# Patient Record
Sex: MALE | Race: AMERICAN INDIAN OR ALASKA NATIVE
[De-identification: names, ages, dates, MRNs, and addresses within clinical notes are randomized per-mention and may not be internally consistent; named-entity substitution may affect disease eponyms.]

---

## 2021-01-04 ENCOUNTER — HOSPITAL ENCOUNTER (EMERGENCY)
Dept: HOSPITAL 5 - ED | Age: 61
LOS: 1 days | Discharge: HOME | End: 2021-01-05
Payer: COMMERCIAL

## 2021-01-04 DIAGNOSIS — Z98.890: ICD-10-CM

## 2021-01-04 DIAGNOSIS — U07.1: Primary | ICD-10-CM

## 2021-01-04 PROCEDURE — 80053 COMPREHEN METABOLIC PANEL: CPT

## 2021-01-04 PROCEDURE — 85025 COMPLETE CBC W/AUTO DIFF WBC: CPT

## 2021-01-04 PROCEDURE — 36415 COLL VENOUS BLD VENIPUNCTURE: CPT

## 2021-01-04 PROCEDURE — 71046 X-RAY EXAM CHEST 2 VIEWS: CPT

## 2021-01-05 VITALS — DIASTOLIC BLOOD PRESSURE: 86 MMHG | SYSTOLIC BLOOD PRESSURE: 128 MMHG

## 2021-01-05 LAB
ALBUMIN SERPL-MCNC: 3.8 G/DL (ref 3.9–5)
ALT SERPL-CCNC: 83 UNITS/L (ref 7–56)
BASOPHILS # (AUTO): 0 K/MM3 (ref 0–0.1)
BASOPHILS NFR BLD AUTO: 0.5 % (ref 0–1.8)
BUN SERPL-MCNC: 10 MG/DL (ref 9–20)
BUN/CREAT SERPL: 13 %
CALCIUM SERPL-MCNC: 9.3 MG/DL (ref 8.4–10.2)
EOSINOPHIL # BLD AUTO: 0 K/MM3 (ref 0–0.4)
EOSINOPHIL NFR BLD AUTO: 0.2 % (ref 0–4.3)
HCT VFR BLD CALC: 36 % (ref 35.5–45.6)
HEMOLYSIS INDEX: 5
HGB BLD-MCNC: 13.2 GM/DL (ref 11.8–15.2)
LYMPHOCYTES # BLD AUTO: 0.6 K/MM3 (ref 1.2–5.4)
LYMPHOCYTES NFR BLD AUTO: 11.9 % (ref 13.4–35)
MCHC RBC AUTO-ENTMCNC: 37 % (ref 32–34)
MCV RBC AUTO: 93 FL (ref 84–94)
MONOCYTES # (AUTO): 0.3 K/MM3 (ref 0–0.8)
MONOCYTES % (AUTO): 6.4 % (ref 0–7.3)
PLATELET # BLD: 290 K/MM3 (ref 140–440)
RBC # BLD AUTO: 3.86 M/MM3 (ref 3.65–5.03)

## 2021-01-05 NOTE — EVENT NOTE
ED Screening Note


ED Screening Note: 





Patient states that he began having symptoms of COVID-19 on 12/26/2020


He states he then tested positive for COVID-19 on 12/29/2020


He states his symptoms have been fever, mild dry cough, nausea


He states occasionally he gets winded with talking but otherwise feels no 

shortness of breath


He denies any chest pain


He states his wife has been monitoring his pulse ox at home and he states that 2

hours ago it had dropped to 86% on room air


He denies any past medical history, no allergies to medications, non-smoker





This initial assessment/diagnostic orders/clinical plan/treatment(s) is/are 

subject to change based on patients health status, clinical progression and re-

assessment by fellow clinical providers in the ED. Further treatment and workup 

at subsequent clinical providers discretion. Patient/guardian urged not to elope

from the ED as their condition may be serious if not clinically assessed and 

managed. 





Initial orders include: 


labs, CXR

## 2021-01-05 NOTE — EMERGENCY DEPARTMENT REPORT
HPI





- General


Chief Complaint: Dyspnea/Respdistress


PUI?: Yes


Time Seen by Provider: 21 00:54





- HPI


HPI: 





Room 39








The patient is a 60-year-old male present with a chief complaint of low pulse 

ox.  The patient was diagnosed Covid positive approximately 7 days ago.  The 

patient states his wife is checking his pulse ox at home and was reading 86%.  

The patient denies shortness of breath.  When asked how he is feeling patient 

states he feels okay.  Patient denies pain of any type.  Patient is not in any 

distress states he thinks his wife's pulse ox may not be accurate.  Patient 

denies any complaint





ED Past Medical Hx





- Past Medical History


Previous Medical History?: No





- Surgical History


Past Surgical History?: Yes


Additional Surgical History: right knee





- Family History


Family history: no significant





- Social History


Smoking Status: Never Smoker


Substance Use Type: None





ED Review of Systems


ROS: 


Stated complaint: LOW OXYGEN/TESTED POS/COVID 7DAYS AGO


Other details as noted in HPI





Constitutional: no symptoms reported


Eyes: denies: eye pain


ENT: denies: throat pain


Respiratory: cough.  denies: shortness of breath


Cardiovascular: denies: chest pain


Endocrine: no symptoms reported


Gastrointestinal: denies: abdominal pain


Genitourinary: denies: dysuria


Musculoskeletal: denies: back pain


Neurological: denies: headache





Physical Exam





- Physical Exam


Vital Signs: 


                                   Vital Signs











  21





  00:24


 


Temperature 98.0 F


 


Pulse Rate 94 H


 


Respiratory 17





Rate 


 


Blood Pressure 128/86


 


O2 Sat by Pulse 95





Oximetry 











Physical Exam: 





GENERAL: The patient is well-developed well-nourished male sitting in chair not 

appearing to be in acute distress. []


HEENT: Normocephalic.  Atraumatic.  Extraocular motions are intact.  Patient has

 moist mucous membranes.


NECK: Supple.  Trachea midline


CHEST/LUNGS: No crackles auscultated.  There is no respiratory distress noted.


HEART/CARDIOVASCULAR: Regular.  There is no tachycardia.  There is no gallop rub

 or murmur.


ABDOMEN: Abdomen is soft, nontender.  Patient has normal bowel sounds.  There is

 no abdominal distention.


SKIN: There is no rash.  There is no edema.  There is no diaphoresis.


NEURO: The patient is awake, alert, and oriented.  The patient is cooperative.  

The patient has normal speech


MUSCULOSKELETAL:  There is no evidence of acute injury.





ED Course


                                   Vital Signs











  21





  00:24


 


Temperature 98.0 F


 


Pulse Rate 94 H


 


Respiratory 17





Rate 


 


Blood Pressure 128/86


 


O2 Sat by Pulse 95





Oximetry 














- Reevaluation(s)


Reevaluation #1: 





21 04:00


Patient's SPO2 99% on room air after ambulating for 2 minutes





ED Medical Decision Making





- Lab Data


Result diagrams: 


                                 21 01:42





                                 21 01:42





                                Laboratory Tests











  21





  01:42 01:42


 


WBC  4.7 


 


RBC  3.86 


 


Hgb  13.2 


 


Hct  36.0 


 


MCV  93 


 


MCH  34 H 


 


MCHC  37 H 


 


RDW  13.0 L 


 


Plt Count  290 


 


Lymph % (Auto)  11.9 L 


 


Mono % (Auto)  6.4 


 


Eos % (Auto)  0.2 


 


Baso % (Auto)  0.5 


 


Lymph # (Auto)  0.6 L 


 


Mono # (Auto)  0.3 


 


Eos # (Auto)  0.0 


 


Baso # (Auto)  0.0 


 


Seg Neutrophils %  81.0 H 


 


Seg Neutrophils #  3.8 


 


Sodium   137


 


Potassium   3.8


 


Chloride   99.5


 


Carbon Dioxide   25


 


Anion Gap   16


 


BUN   10


 


Creatinine   0.8


 


Estimated GFR   > 60


 


BUN/Creatinine Ratio   13


 


Glucose   168 H


 


Calcium   9.3


 


Total Bilirubin   0.40


 


AST   88 H


 


ALT   83 H


 


Alkaline Phosphatase   76


 


Total Protein   7.7


 


Albumin   3.8 L


 


Albumin/Globulin Ratio   1.0














- Radiology Data


Radiology results: report reviewed (Chest x-ray), image reviewed (Chest x-ray)


interpreted by me: 





Chest x-ray-left lower lobe opacity, no foreign body, no pneumothorax





[facility] [address] [place] [address] 

XRay Report Signed Patient: ENRRIQUE ODELL MR# : [id number] : 

1960 Acct:[id number] Age/Sex: 60 / M ADM Date: 21 Loc: ED 

Attending Dr: Ordering Physician: RENETTA STOCKTON Date of Service: 21 

Procedure(s): XR chest routine 2V Accession Number(s): [id number] cc: RENETTA STOCKTON Fluoro Time In Minutes: XR chest routine 2V INDICATION / CLINICAL 

INFORMATION: fever, cough COMPARISON: None available. FINDINGS: SUPPORT DEVICES:

 None. HEART / MEDIASTINUM: No significant abnormality. LUNGS / PLEURA: There 

are scattered bilateral parenchymal opacities with peripheral predilection most 

pronounced in the left mid and lower lung zone. Costophrenic sulci are sharp. No

 pneumothorax. ADDITIONAL FINDINGS: No significant additional findings. 

IMPRESSION: 1. Airspace disease concerning for infection. Covid is a 

consideration. Signer Name: Parish Doran MD Signed: 2021 1:38 AM Workstation 

Name: [id number] Transcribed By: ARNEL Dictated By: Parish Doran MD 

Electronically Authenticated By: Parish Doran MD Signed Date/Time: 21 DD/DT: 21 TD/TT: 











- Differential Diagnosis


COVID-19


Critical care attestation.: 


If time is entered above; I have spent that time in minutes in the direct care 

of this critically ill patient, excluding procedure time.








ED Disposition


Clinical Impression: 


 COVID-19 virus infection





Disposition: - TO HOME OR SELFCARE


Is pt being admited?: No


Does the pt Need Aspirin: No


Condition: Stable


Instructions:  Prevent the Spread of COVID-19 if You Are Sick - CDC, COVID-19 

Frequently Asked Questions


Additional Instructions: 


Return to the emergency department should you develop worsening symptoms, 

inability to tolerate food or liquids, high fever or any other concerns


Referrals: 


[facility], [place] [Primary Care Provider] - 3-5 Days


Time of Disposition: 04:00

## 2021-01-05 NOTE — XRAY REPORT
XR chest routine 2V



INDICATION / CLINICAL INFORMATION:

fever, cough



COMPARISON: 

None available.



FINDINGS:



SUPPORT DEVICES: None.



HEART / MEDIASTINUM: No significant abnormality. 



LUNGS / PLEURA: There are scattered bilateral parenchymal opacities with peripheral predilection most
 pronounced in the left mid and lower lung zone. Costophrenic sulci are sharp. No pneumothorax.



ADDITIONAL FINDINGS: No significant additional findings.



IMPRESSION:

1. Airspace disease concerning for infection. Covid is a consideration.



Signer Name: Parish Doran MD 

Signed: 1/5/2021 1:38 AM

Workstation Name: [id number]